# Patient Record
Sex: FEMALE | Race: WHITE | NOT HISPANIC OR LATINO | Employment: FULL TIME | URBAN - METROPOLITAN AREA
[De-identification: names, ages, dates, MRNs, and addresses within clinical notes are randomized per-mention and may not be internally consistent; named-entity substitution may affect disease eponyms.]

---

## 2017-03-29 ENCOUNTER — HOSPITAL ENCOUNTER (OUTPATIENT)
Dept: MEDSURG UNIT | Facility: CLINIC | Age: 27
Discharge: HOME | End: 2017-03-29
Attending: OBSTETRICS & GYNECOLOGY | Admitting: OBSTETRICS & GYNECOLOGY

## 2017-03-29 LAB
ALT SERPL W P-5'-P-CCNC: 14 U/L (ref 0–45)
AST SERPL W P-5'-P-CCNC: 19 U/L (ref 0–40)
CREAT SERPL-MCNC: 0.6 MG/DL (ref 0.6–1.1)
GFR SERPL CREATININE-BSD FRML MDRD: >60 ML/MIN/1.73M2

## 2017-03-29 RX ORDER — ACETAMINOPHEN 500 MG
500 TABLET ORAL EVERY 6 HOURS PRN
Status: SHIPPED | COMMUNITY
Start: 2017-03-29

## 2017-03-31 ENCOUNTER — RECORDS - HEALTHEAST (OUTPATIENT)
Dept: ADMINISTRATIVE | Facility: OTHER | Age: 27
End: 2017-03-31

## 2017-04-14 ENCOUNTER — ANESTHESIA - HEALTHEAST (OUTPATIENT)
Dept: OBGYN | Facility: CLINIC | Age: 27
End: 2017-04-14

## 2017-04-17 ENCOUNTER — COMMUNICATION - HEALTHEAST (OUTPATIENT)
Dept: OBGYN | Facility: CLINIC | Age: 27
End: 2017-04-17

## 2019-03-13 ENCOUNTER — HOSPITAL ENCOUNTER (OUTPATIENT)
Dept: MEDSURG UNIT | Facility: CLINIC | Age: 29
Discharge: HOME | End: 2019-03-13
Attending: OBSTETRICS & GYNECOLOGY | Admitting: OBSTETRICS & GYNECOLOGY

## 2019-03-13 LAB
BASOPHILS # BLD AUTO: 0 THOU/UL (ref 0–0.2)
BASOPHILS NFR BLD AUTO: 0 % (ref 0–2)
EOSINOPHIL # BLD AUTO: 0.1 THOU/UL (ref 0–0.4)
EOSINOPHIL NFR BLD AUTO: 1 % (ref 0–6)
ERYTHROCYTE [DISTWIDTH] IN BLOOD BY AUTOMATED COUNT: 12.5 % (ref 11–14.5)
FETAL RBC % LFV: 0 %
HCT VFR BLD AUTO: 35 % (ref 35–47)
HGB BLD-MCNC: 11.7 G/DL (ref 12–16)
LYMPHOCYTES # BLD AUTO: 1.5 THOU/UL (ref 0.8–4.4)
LYMPHOCYTES NFR BLD AUTO: 19 % (ref 20–40)
MCH RBC QN AUTO: 31 PG (ref 27–34)
MCHC RBC AUTO-ENTMCNC: 33.4 G/DL (ref 32–36)
MCV RBC AUTO: 93 FL (ref 80–100)
MONOCYTES # BLD AUTO: 0.6 THOU/UL (ref 0–0.9)
MONOCYTES NFR BLD AUTO: 8 % (ref 2–10)
NEUTROPHILS # BLD AUTO: 5.8 THOU/UL (ref 2–7.7)
NEUTROPHILS NFR BLD AUTO: 72 % (ref 50–70)
PLATELET # BLD AUTO: 183 THOU/UL (ref 140–440)
PMV BLD AUTO: 9.4 FL (ref 8.5–12.5)
RBC # BLD AUTO: 3.77 MILL/UL (ref 3.8–5.4)
WBC: 8.2 THOU/UL (ref 4–11)

## 2019-03-13 ASSESSMENT — MIFFLIN-ST. JEOR: SCORE: 1260.2

## 2019-06-17 ENCOUNTER — HOSPITAL ENCOUNTER (OUTPATIENT)
Dept: OBGYN | Facility: CLINIC | Age: 29
Discharge: HOME | End: 2019-06-17
Attending: OBSTETRICS & GYNECOLOGY | Admitting: OBSTETRICS & GYNECOLOGY

## 2019-06-17 LAB — RUPTURE OF FETAL MEMBRANES BY ROM PLUS: NEGATIVE

## 2019-06-18 ENCOUNTER — HOSPITAL ENCOUNTER (OUTPATIENT)
Dept: MEDSURG UNIT | Facility: CLINIC | Age: 29
Discharge: HOME | End: 2019-06-18
Attending: OBSTETRICS & GYNECOLOGY | Admitting: OBSTETRICS & GYNECOLOGY

## 2019-06-21 ENCOUNTER — ANESTHESIA - HEALTHEAST (OUTPATIENT)
Dept: OBGYN | Facility: CLINIC | Age: 29
End: 2019-06-21

## 2019-10-24 ENCOUNTER — PATIENT OUTREACH (OUTPATIENT)
Dept: CARE COORDINATION | Facility: CLINIC | Age: 29
End: 2019-10-24

## 2019-10-24 DIAGNOSIS — Z76.89 HEALTH CARE HOME: Primary | ICD-10-CM

## 2020-09-27 LAB
ABO (EXTERNAL): NORMAL
RH (EXTERNAL): POSITIVE

## 2021-02-18 LAB
ABO (EXTERNAL): NORMAL
HIV1+2 AB SERPL QL IA: NONREACTIVE
RH (EXTERNAL): NEGATIVE
RUBELLA ANTIBODY IGG (EXTERNAL): NORMAL

## 2021-02-19 LAB
HEMOGLOBIN (EXTERNAL): 11.4 G/DL (ref 11.1–15.9)
HEPATITIS B SURFACE ANTIGEN (EXTERNAL): NEGATIVE
PLATELET COUNT (EXTERNAL): 260 10E3/UL (ref 150–450)
VDRL (SYPHILIS) (EXTERNAL): NONREACTIVE

## 2021-05-29 NOTE — PROGRESS NOTES
SVE by RN, unchanged.  Pt stating while walking she notices her contractions to be evvery 2-3 minutes however when sitting, they are less frequent.  Pt denies SROM or bleeding. The patient is anxious about her prior rapid cervical dilation. Dr. Sauer paged.

## 2021-05-29 NOTE — PROGRESS NOTES
"Pt here with her  to rule out if she was in labor.  Pt stating since she has woken up, she has had contractions consistently every 5 minutes.  Pt is able to walk and talk through them and they palpate mild.  Pt denies SROM, bleeding or concern with fetal movement.    SVE by RN as noted.  Pt expressing concern with \"how fast things went last time\" that she is requesting to be here, be monitored and see if her cervix changes over time.      "

## 2021-05-29 NOTE — ANESTHESIA PREPROCEDURE EVALUATION
Anesthesia Evaluation      Patient summary reviewed     Airway   Mallampati: I  Neck ROM: full   Pulmonary - negative ROS and normal exam    breath sounds clear to auscultation                         Cardiovascular - negative ROS and normal exam  Rhythm: regular  Rate: normal,         Neuro/Psych - negative ROS     Endo/Other    (+) pregnant     GI/Hepatic/Renal - negative ROS           Dental - normal exam                        Anesthesia Plan  Planned anesthetic: epidural    ASA 2     Anesthetic plan and risks discussed with: patient and spouse    Post-op plan: routine recovery

## 2021-05-29 NOTE — PLAN OF CARE
Pt came in with complaints of leaking fluid. ROM+ negative. Periods of low baseline, but with moderate variability and accelerations. Pt turned to left side and eating, baseline now 120s. Dr. Paredes aware. Pt to be discharged home per provider's orders.

## 2021-05-29 NOTE — ANESTHESIA PROCEDURE NOTES
Epidural Block    Patient location during procedure: OB  Time Called: 6/21/2019 3:00 AM  Reason for Block:labor epidural  Staffing:  Performing  Anesthesiologist: Kevin Pagan MD  Preanesthetic Checklist  Completed: patient identified, risks, benefits, and alternatives discussed, timeout performed, consent obtained, airway assessed, oxygen available, suction available, emergency drugs available and hand hygiene performed  Procedure  Patient position: sitting  Prep: ChloraPrep and site prepped and draped  Patient monitoring: continuous pulse oximetry, heart rate and blood pressure  Approach: midline  Location: L2-L3  Injection technique: JUSTICE air  Number of Attempts:2  Needle  Needle type: Mariano   Needle gauge: 18 G     Catheter in Space: 6  Assessment  Sensory level:      Events: blood aspirated and positive IV test     Additional Notes:  Catheter replaced after above noted

## 2021-06-02 VITALS — HEIGHT: 63 IN | WEIGHT: 127 LBS | BODY MASS INDEX: 22.5 KG/M2

## 2021-06-09 NOTE — PROGRESS NOTES
Pt here for r/o of pre-eclampsia related to pt's report of headache, blurry vision, and nausea.  Pt denies swelling or epigastric pain.  Pt also reports a sore throat that is most bothersome to her and she wonders if she might be developing an URI.  Dr Paredes checked in on pt in triage.  Labs WNL.  WNL labs called to Dr Paredes in clinic and told staff that I will be discharging pt.  D/c instructions reviewed with pt and pt dc'ed to home under self care. Ally Tripp

## 2021-06-09 NOTE — PROGRESS NOTES
OB OBSERVATION NOTE    CHIEF COMPLAINT: headache and nausea    OBSTETRICAL / DATING HISTORY:  Estimated Date of Delivery: 17   Gestational Age: 37w6d    OB History    Para Term  AB SAB TAB Ectopic Multiple Living   1               # Outcome Date GA Lbr Lennox/2nd Weight Sex Delivery Anes PTL Lv   1 Current                      TESTING:  Labs:  No results found for requested labs within last 2700 days.          PAST MEDICAL HISTORY:  Past Medical History:   Diagnosis Date     Female infertility      Mental disorder     anxiety and depression       PAST SURGICAL HISTORY:  Past Surgical History:   Procedure Laterality Date     CYST REMOVAL Right     axilla     WISDOM TOOTH EXTRACTION Bilateral         FAMILY HISTORY:  No family history on file.    ALLERGIES:  Allergies   Allergen Reactions     Amoxicillin Hives     Keflex [Cephalexin] Hives     Minocycline Hives     Penicillins Hives     Tetracycline Hives         HISTORY OF PRESENT ILLNESS:    (Please see scanned  sheets for prenatal history. Examination at the time of admission revealed no interval change in the patient s history or physical exam except as described below.)    Aleida called in with c/o headache, nausea, blurred vision and a sore throat.  I had her present to L&D to r/o Pre-eclampsia.     REVIEW OF SYSTEMS:  See HPI, otherwise the review of systems is unremarkable.     PHYSICAL EXAM:   /66  Pulse 63  Temp 98.1  F (36.7  C) (Oral)   Resp 16   HEART: RRR  LUNGS: CTAB  EXT: 2+ DTRs  Fetal Heart Tones: category 1  CONTRACTIONS:  none  CERVIX: dilated - deferred    Membranes: intact  Fetal Presentation: vertex    Impression:  Likely viral syndrome.  She refused PO antiemetic. Pt. To be discharged if the PIH panel is normal.    Plan:  Routine prenatal care.         Sultana Paredes MD

## 2021-06-24 NOTE — PROGRESS NOTES
Discussed SBAR with Dr. Miller on the unit. Orders to monitor for 4 hours from arrival and obtain a CBC and KHB. Orders placed. Will discuss with plan with patient. Radha Mcrae 03/13/19 9:18 AM

## 2021-06-24 NOTE — PROGRESS NOTES
Pt is doing well.  No active bleeding or sudden onset of abdominal pain.  Pt reports feeling sore in her sacrum/bottom from the fall, and pulling in her lower abdominal area when she gets out of bed.  Dr Miller updated and order for tylenol received.  Continue to observe until 1240. Ally Tripp

## 2021-06-24 NOTE — PROGRESS NOTES
"Patient reports to [facility] with c/o of falling twice this morning. First fall occurred at 0715 and about an hour later at her place of work. Patient c/o having contractions, \"on and off,\" reporting the pain at 5/10. EFM placed on abdomen, FHT's audible at 140. Verbal orders to EFM within the first 4 hours after fall and to determine if patient is RH negative and if so to draw labs. Will obtain lab results from clinic and continue to monitor until 1115.  "

## 2021-06-24 NOTE — PROGRESS NOTES
Dr Engebretson called and updated that pt has no new signs or symptoms.  Pt to follow up at her next prenatal appt, in the next 2 weeks.  After 1240, EFM to be removed and pt discharged.  Ally Tripp

## 2021-06-26 ENCOUNTER — HEALTH MAINTENANCE LETTER (OUTPATIENT)
Age: 31
End: 2021-06-26

## 2021-07-01 ENCOUNTER — TRANSFERRED RECORDS (OUTPATIENT)
Dept: HEALTH INFORMATION MANAGEMENT | Facility: CLINIC | Age: 31
End: 2021-07-01

## 2021-07-02 ENCOUNTER — MEDICAL CORRESPONDENCE (OUTPATIENT)
Dept: HEALTH INFORMATION MANAGEMENT | Facility: CLINIC | Age: 31
End: 2021-07-02

## 2021-07-05 ENCOUNTER — AMBULATORY - HEALTHEAST (OUTPATIENT)
Dept: PHARMACY | Facility: CLINIC | Age: 31
End: 2021-07-05

## 2021-07-05 DIAGNOSIS — D50.9 IRON DEFICIENCY ANEMIA, UNSPECIFIED: ICD-10-CM

## 2021-07-09 DIAGNOSIS — D50.9 IRON DEFICIENCY ANEMIA, UNSPECIFIED IRON DEFICIENCY ANEMIA TYPE: Primary | ICD-10-CM

## 2021-07-09 RX ORDER — HEPARIN SODIUM,PORCINE 10 UNIT/ML
5 VIAL (ML) INTRAVENOUS
Status: CANCELLED | OUTPATIENT
Start: 2021-07-12

## 2021-07-09 RX ORDER — METHYLPREDNISOLONE SODIUM SUCCINATE 125 MG/2ML
125 INJECTION, POWDER, LYOPHILIZED, FOR SOLUTION INTRAMUSCULAR; INTRAVENOUS
Status: CANCELLED
Start: 2021-07-12

## 2021-07-09 RX ORDER — NALOXONE HYDROCHLORIDE 0.4 MG/ML
0.2 INJECTION, SOLUTION INTRAMUSCULAR; INTRAVENOUS; SUBCUTANEOUS
Status: CANCELLED | OUTPATIENT
Start: 2021-07-12

## 2021-07-09 RX ORDER — HEPARIN SODIUM (PORCINE) LOCK FLUSH IV SOLN 100 UNIT/ML 100 UNIT/ML
5 SOLUTION INTRAVENOUS
Status: CANCELLED | OUTPATIENT
Start: 2021-07-12

## 2021-07-09 RX ORDER — ALBUTEROL SULFATE 90 UG/1
1-2 AEROSOL, METERED RESPIRATORY (INHALATION)
Status: CANCELLED
Start: 2021-07-12

## 2021-07-09 RX ORDER — MEPERIDINE HYDROCHLORIDE 50 MG/ML
25 INJECTION INTRAMUSCULAR; INTRAVENOUS; SUBCUTANEOUS EVERY 30 MIN PRN
Status: CANCELLED | OUTPATIENT
Start: 2021-07-12

## 2021-07-09 RX ORDER — DIPHENHYDRAMINE HYDROCHLORIDE 50 MG/ML
50 INJECTION INTRAMUSCULAR; INTRAVENOUS
Status: CANCELLED
Start: 2021-07-12

## 2021-07-09 RX ORDER — EPINEPHRINE 1 MG/ML
0.3 INJECTION, SOLUTION INTRAMUSCULAR; SUBCUTANEOUS EVERY 5 MIN PRN
Status: CANCELLED | OUTPATIENT
Start: 2021-07-12

## 2021-07-09 RX ORDER — ALBUTEROL SULFATE 0.83 MG/ML
2.5 SOLUTION RESPIRATORY (INHALATION)
Status: CANCELLED | OUTPATIENT
Start: 2021-07-12

## 2021-07-11 PROBLEM — D50.9 IRON DEFICIENCY ANEMIA, UNSPECIFIED: Status: ACTIVE | Noted: 2021-07-05

## 2021-07-14 PROBLEM — Z34.90 PREGNANT: Status: RESOLVED | Noted: 2017-04-13 | Resolved: 2019-06-21

## 2021-07-16 ENCOUNTER — INFUSION THERAPY VISIT (OUTPATIENT)
Dept: INFUSION THERAPY | Facility: CLINIC | Age: 31
End: 2021-07-16
Payer: COMMERCIAL

## 2021-07-16 VITALS
OXYGEN SATURATION: 99 % | SYSTOLIC BLOOD PRESSURE: 92 MMHG | RESPIRATION RATE: 16 BRPM | TEMPERATURE: 98.2 F | DIASTOLIC BLOOD PRESSURE: 59 MMHG | HEART RATE: 73 BPM

## 2021-07-16 DIAGNOSIS — D50.9 IRON DEFICIENCY ANEMIA, UNSPECIFIED IRON DEFICIENCY ANEMIA TYPE: Primary | ICD-10-CM

## 2021-07-16 PROCEDURE — 250N000011 HC RX IP 250 OP 636: Performed by: OBSTETRICS & GYNECOLOGY

## 2021-07-16 PROCEDURE — 258N000003 HC RX IP 258 OP 636: Performed by: OBSTETRICS & GYNECOLOGY

## 2021-07-16 PROCEDURE — 96365 THER/PROPH/DIAG IV INF INIT: CPT

## 2021-07-16 RX ORDER — METHYLPREDNISOLONE SODIUM SUCCINATE 125 MG/2ML
125 INJECTION, POWDER, LYOPHILIZED, FOR SOLUTION INTRAMUSCULAR; INTRAVENOUS
Status: CANCELLED
Start: 2021-07-19

## 2021-07-16 RX ORDER — NALOXONE HYDROCHLORIDE 0.4 MG/ML
0.2 INJECTION, SOLUTION INTRAMUSCULAR; INTRAVENOUS; SUBCUTANEOUS
Status: DISCONTINUED | OUTPATIENT
Start: 2021-07-16 | End: 2021-07-16 | Stop reason: HOSPADM

## 2021-07-16 RX ORDER — ALBUTEROL SULFATE 90 UG/1
1-2 AEROSOL, METERED RESPIRATORY (INHALATION)
Status: CANCELLED
Start: 2021-07-19

## 2021-07-16 RX ORDER — HEPARIN SODIUM (PORCINE) LOCK FLUSH IV SOLN 100 UNIT/ML 100 UNIT/ML
5 SOLUTION INTRAVENOUS
Status: CANCELLED | OUTPATIENT
Start: 2021-07-19

## 2021-07-16 RX ORDER — EPINEPHRINE 1 MG/ML
0.3 INJECTION, SOLUTION INTRAMUSCULAR; SUBCUTANEOUS EVERY 5 MIN PRN
Status: CANCELLED | OUTPATIENT
Start: 2021-07-19

## 2021-07-16 RX ORDER — EPINEPHRINE 1 MG/ML
0.3 INJECTION, SOLUTION INTRAMUSCULAR; SUBCUTANEOUS EVERY 5 MIN PRN
Status: DISCONTINUED | OUTPATIENT
Start: 2021-07-16 | End: 2021-07-16 | Stop reason: HOSPADM

## 2021-07-16 RX ORDER — DIPHENHYDRAMINE HYDROCHLORIDE 50 MG/ML
50 INJECTION INTRAMUSCULAR; INTRAVENOUS
Status: CANCELLED
Start: 2021-07-19

## 2021-07-16 RX ORDER — METHYLPREDNISOLONE SODIUM SUCCINATE 125 MG/2ML
125 INJECTION, POWDER, LYOPHILIZED, FOR SOLUTION INTRAMUSCULAR; INTRAVENOUS
Status: DISCONTINUED | OUTPATIENT
Start: 2021-07-16 | End: 2021-07-16 | Stop reason: HOSPADM

## 2021-07-16 RX ORDER — NALOXONE HYDROCHLORIDE 0.4 MG/ML
0.2 INJECTION, SOLUTION INTRAMUSCULAR; INTRAVENOUS; SUBCUTANEOUS
Status: CANCELLED | OUTPATIENT
Start: 2021-07-19

## 2021-07-16 RX ORDER — MEPERIDINE HYDROCHLORIDE 50 MG/ML
25 INJECTION INTRAMUSCULAR; INTRAVENOUS; SUBCUTANEOUS EVERY 30 MIN PRN
Status: DISCONTINUED | OUTPATIENT
Start: 2021-07-16 | End: 2021-07-16 | Stop reason: HOSPADM

## 2021-07-16 RX ORDER — ALBUTEROL SULFATE 90 UG/1
1-2 AEROSOL, METERED RESPIRATORY (INHALATION)
Status: DISCONTINUED | OUTPATIENT
Start: 2021-07-16 | End: 2021-07-16 | Stop reason: HOSPADM

## 2021-07-16 RX ORDER — HEPARIN SODIUM,PORCINE 10 UNIT/ML
5 VIAL (ML) INTRAVENOUS
Status: CANCELLED | OUTPATIENT
Start: 2021-07-19

## 2021-07-16 RX ORDER — MEPERIDINE HYDROCHLORIDE 50 MG/ML
25 INJECTION INTRAMUSCULAR; INTRAVENOUS; SUBCUTANEOUS EVERY 30 MIN PRN
Status: CANCELLED | OUTPATIENT
Start: 2021-07-19

## 2021-07-16 RX ORDER — DIPHENHYDRAMINE HYDROCHLORIDE 50 MG/ML
50 INJECTION INTRAMUSCULAR; INTRAVENOUS
Status: DISCONTINUED | OUTPATIENT
Start: 2021-07-16 | End: 2021-07-16 | Stop reason: HOSPADM

## 2021-07-16 RX ORDER — ALBUTEROL SULFATE 0.83 MG/ML
2.5 SOLUTION RESPIRATORY (INHALATION)
Status: DISCONTINUED | OUTPATIENT
Start: 2021-07-16 | End: 2021-07-16 | Stop reason: HOSPADM

## 2021-07-16 RX ORDER — ALBUTEROL SULFATE 0.83 MG/ML
2.5 SOLUTION RESPIRATORY (INHALATION)
Status: CANCELLED | OUTPATIENT
Start: 2021-07-19

## 2021-07-16 RX ADMIN — FERUMOXYTOL 510 MG: 510 INJECTION INTRAVENOUS at 09:26

## 2021-07-16 RX ADMIN — SODIUM CHLORIDE 250 ML: 9 INJECTION, SOLUTION INTRAVENOUS at 09:26

## 2021-07-16 NOTE — PROGRESS NOTES
Infusion Nursing Note:  Aleida Carrizales presents today for iron infusion.    Patient seen by provider today: No   present during visit today: Not Applicable.    Note: Medication hand-out given. Reviewed whom and when to call. Pt observed >30 min post infusion.  Patient did not meet criteria for an asymptomatic covid-19 PCR test in infusion today.     Intravenous Access:  Peripheral IV placed by Salud OLIVER    Treatment Conditions:  Not Applicable.      Post Infusion Assessment:  Patient tolerated infusion without incident.       Discharge Plan:   Discharge instructions reviewed with: Patient.      Marii Lopez RN

## 2021-08-26 LAB — GROUP B STREPTOCOCCUS (EXTERNAL): NEGATIVE

## 2021-09-07 ENCOUNTER — HOSPITAL ENCOUNTER (INPATIENT)
Facility: CLINIC | Age: 31
LOS: 1 days | Discharge: HOME | DRG: 833 | End: 2021-09-07
Attending: OBSTETRICS & GYNECOLOGY | Admitting: OBSTETRICS & GYNECOLOGY
Payer: COMMERCIAL

## 2021-09-07 VITALS
BODY MASS INDEX: 25.59 KG/M2 | HEART RATE: 61 BPM | TEMPERATURE: 97.2 F | OXYGEN SATURATION: 98 % | RESPIRATION RATE: 15 BRPM | HEIGHT: 63 IN | WEIGHT: 144.4 LBS | DIASTOLIC BLOOD PRESSURE: 57 MMHG | SYSTOLIC BLOOD PRESSURE: 100 MMHG

## 2021-09-07 PROBLEM — Z36.89 ENCOUNTER FOR TRIAGE IN PREGNANT PATIENT: Status: ACTIVE | Noted: 2021-09-07

## 2021-09-07 PROBLEM — Z34.90 PREGNANCY: Status: ACTIVE | Noted: 2021-09-07

## 2021-09-07 LAB
ABO/RH(D): NORMAL
ANTIBODY SCREEN: NEGATIVE
BASOPHILS # BLD AUTO: 0 10E3/UL (ref 0–0.2)
BASOPHILS NFR BLD AUTO: 0 %
EOSINOPHIL # BLD AUTO: 0.1 10E3/UL (ref 0–0.7)
EOSINOPHIL NFR BLD AUTO: 1 %
ERYTHROCYTE [DISTWIDTH] IN BLOOD BY AUTOMATED COUNT: 13.2 % (ref 10–15)
HCT VFR BLD AUTO: 34.1 % (ref 35–47)
HGB BLD-MCNC: 11.5 G/DL (ref 11.7–15.7)
IMM GRANULOCYTES # BLD: 0.1 10E3/UL
IMM GRANULOCYTES NFR BLD: 1 %
LYMPHOCYTES # BLD AUTO: 2.1 10E3/UL (ref 0.8–5.3)
LYMPHOCYTES NFR BLD AUTO: 25 %
MCH RBC QN AUTO: 30.4 PG (ref 26.5–33)
MCHC RBC AUTO-ENTMCNC: 33.7 G/DL (ref 31.5–36.5)
MCV RBC AUTO: 90 FL (ref 78–100)
MONOCYTES # BLD AUTO: 0.7 10E3/UL (ref 0–1.3)
MONOCYTES NFR BLD AUTO: 8 %
NEUTROPHILS # BLD AUTO: 5.7 10E3/UL (ref 1.6–8.3)
NEUTROPHILS NFR BLD AUTO: 65 %
NRBC # BLD AUTO: 0 10E3/UL
NRBC BLD AUTO-RTO: 0 /100
PLATELET # BLD AUTO: 173 10E3/UL (ref 150–450)
RBC # BLD AUTO: 3.78 10E6/UL (ref 3.8–5.2)
SARS-COV-2 RNA RESP QL NAA+PROBE: NEGATIVE
SPECIMEN EXPIRATION DATE: NORMAL
T PALLIDUM AB SER QL: NEGATIVE
WBC # BLD AUTO: 8.6 10E3/UL (ref 4–11)

## 2021-09-07 PROCEDURE — 86780 TREPONEMA PALLIDUM: CPT | Performed by: OBSTETRICS & GYNECOLOGY

## 2021-09-07 PROCEDURE — 250N000011 HC RX IP 250 OP 636: Performed by: OBSTETRICS & GYNECOLOGY

## 2021-09-07 PROCEDURE — 36415 COLL VENOUS BLD VENIPUNCTURE: CPT | Performed by: OBSTETRICS & GYNECOLOGY

## 2021-09-07 PROCEDURE — 87635 SARS-COV-2 COVID-19 AMP PRB: CPT | Performed by: OBSTETRICS & GYNECOLOGY

## 2021-09-07 PROCEDURE — 86901 BLOOD TYPING SEROLOGIC RH(D): CPT | Performed by: OBSTETRICS & GYNECOLOGY

## 2021-09-07 PROCEDURE — 250N000013 HC RX MED GY IP 250 OP 250 PS 637: Performed by: OBSTETRICS & GYNECOLOGY

## 2021-09-07 PROCEDURE — 85025 COMPLETE CBC W/AUTO DIFF WBC: CPT | Performed by: OBSTETRICS & GYNECOLOGY

## 2021-09-07 PROCEDURE — 120N000001 HC R&B MED SURG/OB

## 2021-09-07 PROCEDURE — 258N000003 HC RX IP 258 OP 636: Performed by: OBSTETRICS & GYNECOLOGY

## 2021-09-07 RX ORDER — ONDANSETRON 4 MG/1
4 TABLET, ORALLY DISINTEGRATING ORAL EVERY 6 HOURS PRN
Status: DISCONTINUED | OUTPATIENT
Start: 2021-09-07 | End: 2021-09-07 | Stop reason: HOSPADM

## 2021-09-07 RX ORDER — NALOXONE HYDROCHLORIDE 0.4 MG/ML
0.2 INJECTION, SOLUTION INTRAMUSCULAR; INTRAVENOUS; SUBCUTANEOUS
Status: DISCONTINUED | OUTPATIENT
Start: 2021-09-07 | End: 2021-09-07 | Stop reason: HOSPADM

## 2021-09-07 RX ORDER — CARBOPROST TROMETHAMINE 250 UG/ML
250 INJECTION, SOLUTION INTRAMUSCULAR
Status: DISCONTINUED | OUTPATIENT
Start: 2021-09-07 | End: 2021-09-07 | Stop reason: HOSPADM

## 2021-09-07 RX ORDER — OXYTOCIN/0.9 % SODIUM CHLORIDE 30/500 ML
340 PLASTIC BAG, INJECTION (ML) INTRAVENOUS CONTINUOUS PRN
Status: DISCONTINUED | OUTPATIENT
Start: 2021-09-07 | End: 2021-09-07 | Stop reason: HOSPADM

## 2021-09-07 RX ORDER — SODIUM CHLORIDE, SODIUM LACTATE, POTASSIUM CHLORIDE, CALCIUM CHLORIDE 600; 310; 30; 20 MG/100ML; MG/100ML; MG/100ML; MG/100ML
INJECTION, SOLUTION INTRAVENOUS CONTINUOUS
Status: DISCONTINUED | OUTPATIENT
Start: 2021-09-07 | End: 2021-09-07 | Stop reason: HOSPADM

## 2021-09-07 RX ORDER — ACETAMINOPHEN 325 MG/1
650 TABLET ORAL EVERY 4 HOURS PRN
Status: DISCONTINUED | OUTPATIENT
Start: 2021-09-07 | End: 2021-09-07 | Stop reason: HOSPADM

## 2021-09-07 RX ORDER — OXYTOCIN 10 [USP'U]/ML
10 INJECTION, SOLUTION INTRAMUSCULAR; INTRAVENOUS
Status: DISCONTINUED | OUTPATIENT
Start: 2021-09-07 | End: 2021-09-07 | Stop reason: HOSPADM

## 2021-09-07 RX ORDER — FENTANYL CITRATE 50 UG/ML
50-100 INJECTION, SOLUTION INTRAMUSCULAR; INTRAVENOUS
Status: DISCONTINUED | OUTPATIENT
Start: 2021-09-07 | End: 2021-09-07 | Stop reason: HOSPADM

## 2021-09-07 RX ORDER — MISOPROSTOL 200 UG/1
400 TABLET ORAL
Status: DISCONTINUED | OUTPATIENT
Start: 2021-09-07 | End: 2021-09-07 | Stop reason: HOSPADM

## 2021-09-07 RX ORDER — OXYTOCIN/0.9 % SODIUM CHLORIDE 30/500 ML
100-340 PLASTIC BAG, INJECTION (ML) INTRAVENOUS CONTINUOUS PRN
Status: DISCONTINUED | OUTPATIENT
Start: 2021-09-07 | End: 2021-09-07 | Stop reason: HOSPADM

## 2021-09-07 RX ORDER — HYDROXYZINE HYDROCHLORIDE 25 MG/1
50 TABLET, FILM COATED ORAL ONCE
Status: DISCONTINUED | OUTPATIENT
Start: 2021-09-07 | End: 2021-09-07

## 2021-09-07 RX ORDER — MISOPROSTOL 200 UG/1
800 TABLET ORAL
Status: DISCONTINUED | OUTPATIENT
Start: 2021-09-07 | End: 2021-09-07 | Stop reason: HOSPADM

## 2021-09-07 RX ORDER — ONDANSETRON 2 MG/ML
4 INJECTION INTRAMUSCULAR; INTRAVENOUS EVERY 6 HOURS PRN
Status: DISCONTINUED | OUTPATIENT
Start: 2021-09-07 | End: 2021-09-07 | Stop reason: HOSPADM

## 2021-09-07 RX ORDER — METHYLERGONOVINE MALEATE 0.2 MG/ML
200 INJECTION INTRAVENOUS
Status: DISCONTINUED | OUTPATIENT
Start: 2021-09-07 | End: 2021-09-07 | Stop reason: HOSPADM

## 2021-09-07 RX ORDER — NALOXONE HYDROCHLORIDE 0.4 MG/ML
0.4 INJECTION, SOLUTION INTRAMUSCULAR; INTRAVENOUS; SUBCUTANEOUS
Status: DISCONTINUED | OUTPATIENT
Start: 2021-09-07 | End: 2021-09-07 | Stop reason: HOSPADM

## 2021-09-07 RX ORDER — KETOROLAC TROMETHAMINE 30 MG/ML
30 INJECTION, SOLUTION INTRAMUSCULAR; INTRAVENOUS
Status: DISCONTINUED | OUTPATIENT
Start: 2021-09-07 | End: 2021-09-07 | Stop reason: HOSPADM

## 2021-09-07 RX ORDER — PROCHLORPERAZINE MALEATE 10 MG
10 TABLET ORAL EVERY 6 HOURS PRN
Status: DISCONTINUED | OUTPATIENT
Start: 2021-09-07 | End: 2021-09-07 | Stop reason: HOSPADM

## 2021-09-07 RX ORDER — METOCLOPRAMIDE 10 MG/1
10 TABLET ORAL EVERY 6 HOURS PRN
Status: DISCONTINUED | OUTPATIENT
Start: 2021-09-07 | End: 2021-09-07 | Stop reason: HOSPADM

## 2021-09-07 RX ORDER — PROCHLORPERAZINE 25 MG
25 SUPPOSITORY, RECTAL RECTAL EVERY 12 HOURS PRN
Status: DISCONTINUED | OUTPATIENT
Start: 2021-09-07 | End: 2021-09-07 | Stop reason: HOSPADM

## 2021-09-07 RX ORDER — HYDROXYZINE HYDROCHLORIDE 25 MG/1
100 TABLET, FILM COATED ORAL ONCE
Status: COMPLETED | OUTPATIENT
Start: 2021-09-07 | End: 2021-09-07

## 2021-09-07 RX ORDER — LIDOCAINE 40 MG/G
CREAM TOPICAL
Status: DISCONTINUED | OUTPATIENT
Start: 2021-09-07 | End: 2021-09-07 | Stop reason: HOSPADM

## 2021-09-07 RX ORDER — MORPHINE SULFATE 10 MG/ML
10 INJECTION, SOLUTION INTRAMUSCULAR; INTRAVENOUS ONCE
Status: COMPLETED | OUTPATIENT
Start: 2021-09-07 | End: 2021-09-07

## 2021-09-07 RX ORDER — METOCLOPRAMIDE HYDROCHLORIDE 5 MG/ML
10 INJECTION INTRAMUSCULAR; INTRAVENOUS EVERY 6 HOURS PRN
Status: DISCONTINUED | OUTPATIENT
Start: 2021-09-07 | End: 2021-09-07 | Stop reason: HOSPADM

## 2021-09-07 RX ORDER — IBUPROFEN 600 MG/1
600 TABLET, FILM COATED ORAL
Status: DISCONTINUED | OUTPATIENT
Start: 2021-09-07 | End: 2021-09-07 | Stop reason: HOSPADM

## 2021-09-07 RX ADMIN — SODIUM CHLORIDE, POTASSIUM CHLORIDE, SODIUM LACTATE AND CALCIUM CHLORIDE 1000 ML: 600; 310; 30; 20 INJECTION, SOLUTION INTRAVENOUS at 05:26

## 2021-09-07 RX ADMIN — MORPHINE SULFATE 10 MG: 10 INJECTION INTRAVENOUS at 08:38

## 2021-09-07 RX ADMIN — SODIUM CHLORIDE, POTASSIUM CHLORIDE, SODIUM LACTATE AND CALCIUM CHLORIDE 125 ML/HR: 600; 310; 30; 20 INJECTION, SOLUTION INTRAVENOUS at 06:34

## 2021-09-07 RX ADMIN — HYDROXYZINE HYDROCHLORIDE 100 MG: 25 TABLET ORAL at 08:39

## 2021-09-07 ASSESSMENT — MIFFLIN-ST. JEOR: SCORE: 1334.12

## 2021-09-07 NOTE — PROVIDER NOTIFICATION
Dr Gutierres called for pt status update and plan. Pt is given the option to try therapeutic sleep meds and be rechecked after rest or discharge to home. Pt is choosing therapeutic rest meds.     Dr. Gutierres ok with pt off monitor after 20min reactive strip.

## 2021-09-07 NOTE — PROGRESS NOTES
Called Dr. Sauer to update MD of pt progress. SVE 2/70/-3 with no bulging bag of water. RN previously felt bulging bag with SVE 3-4/70/-1. Pt was bolused for epidural and contractions spaced out 3-10 minutes apart. MD ordered for pt to walk. If the pt doesn't make any change that she will more than likely be going home later on in the day.

## 2021-09-07 NOTE — DISCHARGE SUMMARY
Dr. Gutierres to bedside to assess pt. Cervix unchanged. Dr. Gutierres wants pt to discharge post obs status.

## 2021-09-07 NOTE — PROGRESS NOTES
Patient admitted overnight with contractions and initial cervical change to 3-4cm. Received IV fluid bolus for epidural and contractions spaced out. Rechecked this AM and was found to only be 2 cm. Given sleep meds this AM and has not felt any further painful contractions. SVE 1/70/-2. Patient concerned about going home due to history of fast labors. Discussed inability to augment due to gestation at this point. Suggested having people on hand this week in case she needs to come back quickly. Has visit on Thursday to discuss 39 wk IOL. Call sooner with any signs of labor or ROM.

## 2021-09-07 NOTE — DISCHARGE INSTRUCTIONS
Data: Patient assessed in the [facility] for abdominal pain.  Cervical exam unchanged.  Membranes intact.  Contractions 3-8.  Action:  Presumed adequate fetal oxygenation documented (see flow record). Discharge instructions reviewed.  Patient instructed to report change in fetal movement, vaginal leaking of fluid or bleeding, abdominal pain, or any concerns related to the pregnancy to her nurse/physician.    Response: Orders to discharge home per Irlanda Sauer.  Patient verbalized understanding of education and verbalized agreement with plan. Discharged to home at 1351.

## 2021-09-10 ENCOUNTER — LAB (OUTPATIENT)
Dept: LAB | Facility: CLINIC | Age: 31
End: 2021-09-10
Attending: OBSTETRICS & GYNECOLOGY
Payer: COMMERCIAL

## 2021-09-10 DIAGNOSIS — Z33.1 PREGNANCY, INCIDENTAL: ICD-10-CM

## 2021-09-10 LAB — SARS-COV-2 RNA RESP QL NAA+PROBE: NEGATIVE

## 2021-09-10 PROCEDURE — U0005 INFEC AGEN DETEC AMPLI PROBE: HCPCS

## 2021-09-10 PROCEDURE — U0003 INFECTIOUS AGENT DETECTION BY NUCLEIC ACID (DNA OR RNA); SEVERE ACUTE RESPIRATORY SYNDROME CORONAVIRUS 2 (SARS-COV-2) (CORONAVIRUS DISEASE [COVID-19]), AMPLIFIED PROBE TECHNIQUE, MAKING USE OF HIGH THROUGHPUT TECHNOLOGIES AS DESCRIBED BY CMS-2020-01-R: HCPCS

## 2021-09-14 ENCOUNTER — ANESTHESIA EVENT (OUTPATIENT)
Dept: OBGYN | Facility: CLINIC | Age: 31
End: 2021-09-14
Payer: COMMERCIAL

## 2021-09-14 ENCOUNTER — HOSPITAL ENCOUNTER (INPATIENT)
Facility: CLINIC | Age: 31
LOS: 2 days | Discharge: HOME | End: 2021-09-16
Attending: OBSTETRICS & GYNECOLOGY | Admitting: OBSTETRICS & GYNECOLOGY
Payer: COMMERCIAL

## 2021-09-14 ENCOUNTER — ANESTHESIA (OUTPATIENT)
Dept: OBGYN | Facility: CLINIC | Age: 31
End: 2021-09-14
Payer: COMMERCIAL

## 2021-09-14 PROBLEM — Z34.90 ENCOUNTER FOR ELECTIVE INDUCTION OF LABOR: Status: ACTIVE | Noted: 2021-09-14

## 2021-09-14 LAB
ABO/RH(D): NORMAL
ANTIBODY SCREEN: NEGATIVE
HOLD SPECIMEN: NORMAL
SPECIMEN EXPIRATION DATE: NORMAL
T PALLIDUM AB SER QL: NEGATIVE

## 2021-09-14 PROCEDURE — 86780 TREPONEMA PALLIDUM: CPT | Performed by: OBSTETRICS & GYNECOLOGY

## 2021-09-14 PROCEDURE — 250N000009 HC RX 250: Performed by: OBSTETRICS & GYNECOLOGY

## 2021-09-14 PROCEDURE — 0KQM0ZZ REPAIR PERINEUM MUSCLE, OPEN APPROACH: ICD-10-PCS | Performed by: OBSTETRICS & GYNECOLOGY

## 2021-09-14 PROCEDURE — 120N000001 HC R&B MED SURG/OB

## 2021-09-14 PROCEDURE — 36415 COLL VENOUS BLD VENIPUNCTURE: CPT | Performed by: OBSTETRICS & GYNECOLOGY

## 2021-09-14 PROCEDURE — 370N000003 HC ANESTHESIA WARD SERVICE

## 2021-09-14 PROCEDURE — 250N000011 HC RX IP 250 OP 636: Performed by: ANESTHESIOLOGY

## 2021-09-14 PROCEDURE — 3E0R3BZ INTRODUCTION OF ANESTHETIC AGENT INTO SPINAL CANAL, PERCUTANEOUS APPROACH: ICD-10-PCS | Performed by: ANESTHESIOLOGY

## 2021-09-14 PROCEDURE — 250N000009 HC RX 250: Performed by: ANESTHESIOLOGY

## 2021-09-14 PROCEDURE — 250N000013 HC RX MED GY IP 250 OP 250 PS 637: Performed by: OBSTETRICS & GYNECOLOGY

## 2021-09-14 PROCEDURE — 722N000001 HC LABOR CARE VAGINAL DELIVERY SINGLE

## 2021-09-14 PROCEDURE — 10907ZC DRAINAGE OF AMNIOTIC FLUID, THERAPEUTIC FROM PRODUCTS OF CONCEPTION, VIA NATURAL OR ARTIFICIAL OPENING: ICD-10-PCS | Performed by: OBSTETRICS & GYNECOLOGY

## 2021-09-14 PROCEDURE — 86900 BLOOD TYPING SEROLOGIC ABO: CPT | Performed by: OBSTETRICS & GYNECOLOGY

## 2021-09-14 PROCEDURE — 258N000003 HC RX IP 258 OP 636: Performed by: OBSTETRICS & GYNECOLOGY

## 2021-09-14 PROCEDURE — 00HU33Z INSERTION OF INFUSION DEVICE INTO SPINAL CANAL, PERCUTANEOUS APPROACH: ICD-10-PCS | Performed by: ANESTHESIOLOGY

## 2021-09-14 PROCEDURE — 258N000003 HC RX IP 258 OP 636: Performed by: ANESTHESIOLOGY

## 2021-09-14 RX ORDER — ONDANSETRON 4 MG/1
4 TABLET, ORALLY DISINTEGRATING ORAL EVERY 6 HOURS PRN
Status: DISCONTINUED | OUTPATIENT
Start: 2021-09-14 | End: 2021-09-14 | Stop reason: CLARIF

## 2021-09-14 RX ORDER — MISOPROSTOL 200 UG/1
800 TABLET ORAL
Status: DISCONTINUED | OUTPATIENT
Start: 2021-09-14 | End: 2021-09-16 | Stop reason: HOSPADM

## 2021-09-14 RX ORDER — OXYTOCIN 10 [USP'U]/ML
10 INJECTION, SOLUTION INTRAMUSCULAR; INTRAVENOUS
Status: DISCONTINUED | OUTPATIENT
Start: 2021-09-14 | End: 2021-09-14 | Stop reason: CLARIF

## 2021-09-14 RX ORDER — BISACODYL 10 MG
10 SUPPOSITORY, RECTAL RECTAL DAILY PRN
Status: DISCONTINUED | OUTPATIENT
Start: 2021-09-14 | End: 2021-09-16 | Stop reason: HOSPADM

## 2021-09-14 RX ORDER — BUPIVACAINE HYDROCHLORIDE 2.5 MG/ML
INJECTION, SOLUTION EPIDURAL; INFILTRATION; INTRACAUDAL
Status: COMPLETED | OUTPATIENT
Start: 2021-09-14 | End: 2021-09-14

## 2021-09-14 RX ORDER — METHYLERGONOVINE MALEATE 0.2 MG/ML
200 INJECTION INTRAVENOUS
Status: DISCONTINUED | OUTPATIENT
Start: 2021-09-14 | End: 2021-09-16 | Stop reason: HOSPADM

## 2021-09-14 RX ORDER — ONDANSETRON 2 MG/ML
4 INJECTION INTRAMUSCULAR; INTRAVENOUS EVERY 6 HOURS PRN
Status: DISCONTINUED | OUTPATIENT
Start: 2021-09-14 | End: 2021-09-14 | Stop reason: CLARIF

## 2021-09-14 RX ORDER — HYDROCORTISONE 2.5 %
CREAM (GRAM) TOPICAL 3 TIMES DAILY PRN
Status: DISCONTINUED | OUTPATIENT
Start: 2021-09-14 | End: 2021-09-16 | Stop reason: HOSPADM

## 2021-09-14 RX ORDER — CARBOPROST TROMETHAMINE 250 UG/ML
250 INJECTION, SOLUTION INTRAMUSCULAR
Status: DISCONTINUED | OUTPATIENT
Start: 2021-09-14 | End: 2021-09-16 | Stop reason: HOSPADM

## 2021-09-14 RX ORDER — PROCHLORPERAZINE 25 MG
25 SUPPOSITORY, RECTAL RECTAL EVERY 12 HOURS PRN
Status: DISCONTINUED | OUTPATIENT
Start: 2021-09-14 | End: 2021-09-14 | Stop reason: CLARIF

## 2021-09-14 RX ORDER — LIDOCAINE 40 MG/G
CREAM TOPICAL
Status: DISCONTINUED | OUTPATIENT
Start: 2021-09-14 | End: 2021-09-14 | Stop reason: CLARIF

## 2021-09-14 RX ORDER — NALBUPHINE HYDROCHLORIDE 10 MG/ML
2.5-5 INJECTION, SOLUTION INTRAMUSCULAR; INTRAVENOUS; SUBCUTANEOUS EVERY 6 HOURS PRN
Status: DISCONTINUED | OUTPATIENT
Start: 2021-09-14 | End: 2021-09-14 | Stop reason: CLARIF

## 2021-09-14 RX ORDER — DOCUSATE SODIUM 100 MG/1
100 CAPSULE, LIQUID FILLED ORAL DAILY
Status: DISCONTINUED | OUTPATIENT
Start: 2021-09-14 | End: 2021-09-16 | Stop reason: HOSPADM

## 2021-09-14 RX ORDER — MISOPROSTOL 200 UG/1
400 TABLET ORAL
Status: DISCONTINUED | OUTPATIENT
Start: 2021-09-14 | End: 2021-09-16 | Stop reason: HOSPADM

## 2021-09-14 RX ORDER — NALOXONE HYDROCHLORIDE 0.4 MG/ML
0.4 INJECTION, SOLUTION INTRAMUSCULAR; INTRAVENOUS; SUBCUTANEOUS
Status: DISCONTINUED | OUTPATIENT
Start: 2021-09-14 | End: 2021-09-14 | Stop reason: CLARIF

## 2021-09-14 RX ORDER — NALOXONE HYDROCHLORIDE 0.4 MG/ML
0.2 INJECTION, SOLUTION INTRAMUSCULAR; INTRAVENOUS; SUBCUTANEOUS
Status: DISCONTINUED | OUTPATIENT
Start: 2021-09-14 | End: 2021-09-14 | Stop reason: CLARIF

## 2021-09-14 RX ORDER — METHYLERGONOVINE MALEATE 0.2 MG/ML
200 INJECTION INTRAVENOUS
Status: DISCONTINUED | OUTPATIENT
Start: 2021-09-14 | End: 2021-09-14 | Stop reason: CLARIF

## 2021-09-14 RX ORDER — FAMOTIDINE 20 MG
1000 TABLET ORAL
COMMUNITY

## 2021-09-14 RX ORDER — OXYTOCIN/0.9 % SODIUM CHLORIDE 30/500 ML
340 PLASTIC BAG, INJECTION (ML) INTRAVENOUS CONTINUOUS PRN
Status: DISCONTINUED | OUTPATIENT
Start: 2021-09-14 | End: 2021-09-16 | Stop reason: HOSPADM

## 2021-09-14 RX ORDER — ACETAMINOPHEN 325 MG/1
650 TABLET ORAL EVERY 4 HOURS PRN
Status: DISCONTINUED | OUTPATIENT
Start: 2021-09-14 | End: 2021-09-16 | Stop reason: HOSPADM

## 2021-09-14 RX ORDER — LIDOCAINE HCL/EPINEPHRINE/PF 2%-1:200K
VIAL (ML) INJECTION
Status: COMPLETED | OUTPATIENT
Start: 2021-09-14 | End: 2021-09-14

## 2021-09-14 RX ORDER — FENTANYL CITRATE 50 UG/ML
50-100 INJECTION, SOLUTION INTRAMUSCULAR; INTRAVENOUS
Status: DISCONTINUED | OUTPATIENT
Start: 2021-09-14 | End: 2021-09-14 | Stop reason: CLARIF

## 2021-09-14 RX ORDER — IBUPROFEN 600 MG/1
600 TABLET, FILM COATED ORAL
Status: DISCONTINUED | OUTPATIENT
Start: 2021-09-14 | End: 2021-09-14 | Stop reason: CLARIF

## 2021-09-14 RX ORDER — MISOPROSTOL 200 UG/1
400 TABLET ORAL
Status: DISCONTINUED | OUTPATIENT
Start: 2021-09-14 | End: 2021-09-14 | Stop reason: CLARIF

## 2021-09-14 RX ORDER — CARBOPROST TROMETHAMINE 250 UG/ML
250 INJECTION, SOLUTION INTRAMUSCULAR
Status: DISCONTINUED | OUTPATIENT
Start: 2021-09-14 | End: 2021-09-14 | Stop reason: CLARIF

## 2021-09-14 RX ORDER — METOCLOPRAMIDE 10 MG/1
10 TABLET ORAL EVERY 6 HOURS PRN
Status: DISCONTINUED | OUTPATIENT
Start: 2021-09-14 | End: 2021-09-14 | Stop reason: CLARIF

## 2021-09-14 RX ORDER — EPHEDRINE SULFATE 50 MG/ML
5 INJECTION, SOLUTION INTRAMUSCULAR; INTRAVENOUS; SUBCUTANEOUS
Status: DISCONTINUED | OUTPATIENT
Start: 2021-09-14 | End: 2021-09-14 | Stop reason: CLARIF

## 2021-09-14 RX ORDER — MISOPROSTOL 200 UG/1
800 TABLET ORAL
Status: DISCONTINUED | OUTPATIENT
Start: 2021-09-14 | End: 2021-09-14 | Stop reason: CLARIF

## 2021-09-14 RX ORDER — OXYCODONE HYDROCHLORIDE 5 MG/1
5 TABLET ORAL EVERY 4 HOURS PRN
Status: DISCONTINUED | OUTPATIENT
Start: 2021-09-14 | End: 2021-09-16 | Stop reason: HOSPADM

## 2021-09-14 RX ORDER — OXYTOCIN 10 [USP'U]/ML
10 INJECTION, SOLUTION INTRAMUSCULAR; INTRAVENOUS PRN
Status: DISCONTINUED | OUTPATIENT
Start: 2021-09-14 | End: 2021-09-16 | Stop reason: HOSPADM

## 2021-09-14 RX ORDER — OXYTOCIN/0.9 % SODIUM CHLORIDE 30/500 ML
4-24 PLASTIC BAG, INJECTION (ML) INTRAVENOUS CONTINUOUS
Status: DISCONTINUED | OUTPATIENT
Start: 2021-09-14 | End: 2021-09-14 | Stop reason: CLARIF

## 2021-09-14 RX ORDER — METOCLOPRAMIDE HYDROCHLORIDE 5 MG/ML
10 INJECTION INTRAMUSCULAR; INTRAVENOUS EVERY 6 HOURS PRN
Status: DISCONTINUED | OUTPATIENT
Start: 2021-09-14 | End: 2021-09-14 | Stop reason: CLARIF

## 2021-09-14 RX ORDER — ACETAMINOPHEN 325 MG/1
650 TABLET ORAL EVERY 4 HOURS PRN
Status: DISCONTINUED | OUTPATIENT
Start: 2021-09-14 | End: 2021-09-14 | Stop reason: CLARIF

## 2021-09-14 RX ORDER — OXYTOCIN/0.9 % SODIUM CHLORIDE 30/500 ML
100-340 PLASTIC BAG, INJECTION (ML) INTRAVENOUS CONTINUOUS PRN
Status: DISCONTINUED | OUTPATIENT
Start: 2021-09-14 | End: 2021-09-14 | Stop reason: CLARIF

## 2021-09-14 RX ORDER — MODIFIED LANOLIN
OINTMENT (GRAM) TOPICAL
Status: DISCONTINUED | OUTPATIENT
Start: 2021-09-14 | End: 2021-09-16 | Stop reason: HOSPADM

## 2021-09-14 RX ORDER — KETOROLAC TROMETHAMINE 30 MG/ML
30 INJECTION, SOLUTION INTRAMUSCULAR; INTRAVENOUS
Status: DISCONTINUED | OUTPATIENT
Start: 2021-09-14 | End: 2021-09-14 | Stop reason: CLARIF

## 2021-09-14 RX ORDER — OXYTOCIN/0.9 % SODIUM CHLORIDE 30/500 ML
340 PLASTIC BAG, INJECTION (ML) INTRAVENOUS CONTINUOUS PRN
Status: DISCONTINUED | OUTPATIENT
Start: 2021-09-14 | End: 2021-09-14 | Stop reason: CLARIF

## 2021-09-14 RX ORDER — PROCHLORPERAZINE MALEATE 10 MG
10 TABLET ORAL EVERY 6 HOURS PRN
Status: DISCONTINUED | OUTPATIENT
Start: 2021-09-14 | End: 2021-09-14 | Stop reason: CLARIF

## 2021-09-14 RX ORDER — IBUPROFEN 800 MG/1
800 TABLET, FILM COATED ORAL EVERY 6 HOURS PRN
Status: DISCONTINUED | OUTPATIENT
Start: 2021-09-14 | End: 2021-09-16 | Stop reason: HOSPADM

## 2021-09-14 RX ADMIN — BENZOCAINE AND LEVOMENTHOL: 200; 5 SPRAY TOPICAL at 21:21

## 2021-09-14 RX ADMIN — IBUPROFEN 800 MG: 800 TABLET ORAL at 21:21

## 2021-09-14 RX ADMIN — LIDOCAINE HYDROCHLORIDE,EPINEPHRINE BITARTRATE 5 ML: 20; .005 INJECTION, SOLUTION EPIDURAL; INFILTRATION; INTRACAUDAL; PERINEURAL at 15:07

## 2021-09-14 RX ADMIN — SODIUM CHLORIDE, POTASSIUM CHLORIDE, SODIUM LACTATE AND CALCIUM CHLORIDE 1000 ML: 600; 310; 30; 20 INJECTION, SOLUTION INTRAVENOUS at 08:41

## 2021-09-14 RX ADMIN — Medication: at 15:10

## 2021-09-14 RX ADMIN — DOCUSATE SODIUM 100 MG: 100 CAPSULE, LIQUID FILLED ORAL at 21:20

## 2021-09-14 RX ADMIN — Medication: at 21:20

## 2021-09-14 RX ADMIN — Medication 4 MILLI-UNITS/MIN: at 08:49

## 2021-09-14 RX ADMIN — BUPIVACAINE HYDROCHLORIDE 5 ML: 2.5 INJECTION, SOLUTION EPIDURAL; INFILTRATION; INTRACAUDAL at 15:07

## 2021-09-14 RX ADMIN — SODIUM CHLORIDE, POTASSIUM CHLORIDE, SODIUM LACTATE AND CALCIUM CHLORIDE 1000 ML: 600; 310; 30; 20 INJECTION, SOLUTION INTRAVENOUS at 15:50

## 2021-09-14 ASSESSMENT — MIFFLIN-ST. JEOR: SCORE: 1332.31

## 2021-09-14 NOTE — ANESTHESIA PROCEDURE NOTES
Epidural catheter Procedure Note  Pre-Procedure   Staff -        Anesthesiologist:  Ty Anaya MD       Performed By: anesthesiologist       Location: floor       Procedure Start/Stop Times: 9/14/2021 2:55 PM and 9/14/2021 2:17 PM       Pre-Anesthestic Checklist: patient identified, IV checked, risks and benefits discussed, informed consent, monitors and equipment checked, pre-op evaluation and at physician/surgeon's request  Timeout:       Correct Patient: Yes        Correct Procedure: Yes        Correct Site: Yes        Correct Position: Yes   Procedure Documentation  Procedure: epidural catheter       Patient Position: sitting       Skin prep: Chloraprep      Local skin infiltrated with mL of 1% lidocaine.        Insertion Site: L3-4. (midline approach).       Technique: LORT saline        JUSTICE at 4.5 cm.       Needle Type: PoolCubesy needle       Needle Gauge: 18.        Needle Length (Inches): 3.5         Catheter threaded easily.         5 cm epidural space.         Threaded 9.5 cm at skin.        # of attempts: 1 and  # of redirects:     Assessment/Narrative         Paresthesias: No.     Test dose of mL lidocaine 1.5% w/ 1:200,000 epinephrine at.         Test dose negative, 3 minutes after injection, for signs of intravascular, subdural, or intrathecal injection.       Insertion/Infusion Method: LORT saline       Aspiration negative for Heme or CSF via Epidural Catheter.    Medication(s) Administered   0.25% Bupivacaine PF (Epidural), 5 mL  2% Lidocaine w/ 1:200K Epi (EPIDURAL), 5 mL  Medication Administration Time: 9/14/2021 3:07 PM

## 2021-09-14 NOTE — H&P
[facility] Labor and Delivery History and Physical    Aleida Tracey MRN# [id number]   Age: 31 year old YOB: 1990     Date of Admission: 2021    Primary care provider: Dalila Sage    Chief Complaint:   Aleida Tracey is a 31 year old female who is 39w2d pregnant and being admitted for induction of labor, indication elective.    History of present illness:   Patient presents for scheduled elective IOL.  Pregnancy has been complicated by fatigue and sciatica     Pregnancy history:     OBSTETRIC HISTORY:    OB History    Para Term  AB Living   3 2 2 0 0 2   SAB TAB Ectopic Multiple Live Births   0 0 0 0 2      # Outcome Date GA Lbr Lennox/2nd Weight Sex Delivery Anes PTL Lv   3 Current            2 Term 19 40w4d 09:13 / 00:11 4.09 kg (9 lb 0.3 oz) F Vag-Spont EPI N FATOU      Complications: Fetal Intolerance      Name: SUNNI TRACEY      Apgar1: 8  Apgar5: 9   1 Term 17 40w1d 04:35 / 00:28 3.43 kg (7 lb 9 oz) F Vag-Spont Local  FATOU      Name: SUNNI TRACEY      Apgar1: 9  Apgar5: 9       EDC: Estimated Date of Delivery: Sep 19, 2021    GBS Status:   Lab Results   Component Value Date    GBS Negative 2021       Medication Prior to Admission  Medications Prior to Admission   Medication Sig Dispense Refill Last Dose     Vitamin D, Cholecalciferol, 25 MCG (1000 UT) CAPS Take 1,000 Units by mouth        acetaminophen (TYLENOL) 500 MG tablet [ACETAMINOPHEN (TYLENOL) 500 MG TABLET] Take 500 mg by mouth every 6 (six) hours as needed for pain.        prenatal no115-iron-folic acid 29 mg iron- 1 mg Chew [PRENATAL -IRON-FOLIC ACID 29 MG IRON- 1 MG CHEW] Chew 1 tablet daily.      .     Pregnancy Complications/Problems:  39.2 weeks IUP  Fatigue  Sciatica     Maternal Past Medical History:   History reviewed. No pertinent past medical history.                 Family History:   Non-contributory     Social History:   I have  reviewed this patient's social history and commented on significant items within the HPI    Past Surgical History:    Non-contributory    Physical Exam:   Vitals were reviewed  Temp: 98.4  F (36.9  C) Temp src: Oral BP: 110/68 Pulse: 60   Resp: 18        Constitutional:   awake, alert, cooperative, no apparent distress, and appears stated age     Abdomen:   Soft, gravid, nontender     Skin:   normal skin color, texture, turgor      Cervix:   Membranes: SROM- clear   Dilation: 3   Effacement: 70%   Station:-2   Consistency: average   Position: Mid  Presentation:vertex with AROM, then fetal arm felt, head rotated into pelvis with fetal finger felt in front of head.  Bedside US confirms vertex.   Fetal Heart Rate Tracing: reactive and reassuring  Tocometer: frequency q 2-4 minutes                    Assessment:   39.2 week IUP  Elective IOL  Sciatica  COVID-19 negative  GBS negative  Unstable lie, now with AROM        Plan:   Vertex by bedside US - will continue in high Fowlers and recheck cervix in 30 minutes to confirm vertex presentation.  Continue pitocin - may have epidural if desires.  Overall reassuring fetal tracing.     Dalila Sage MD

## 2021-09-14 NOTE — ANESTHESIA PREPROCEDURE EVALUATION
Anesthesia Pre-Procedure Evaluation    Patient: Aleida Carrizales   MRN: [id number] : 1990        Preoperative Diagnosis: * No pre-op diagnosis entered *   Procedure : * No procedures listed *     History reviewed. No pertinent past medical history.   Past Surgical History:   Procedure Laterality Date     CYST REMOVAL Right     axilla     WISDOM TOOTH EXTRACTION Bilateral       Allergies   Allergen Reactions     Amoxicillin Hives     Keflex [Cephalexin] Hives     Minocycline Hives     Penicillins Hives     Tetracycline Hives      Social History     Tobacco Use     Smoking status: Former Smoker     Quit date: 2010     Years since quittin.4     Smokeless tobacco: Never Used   Substance Use Topics     Alcohol use: No      Wt Readings from Last 1 Encounters:   21 65.3 kg (144 lb)        Anesthesia Evaluation   Pt has had prior anesthetic. Type: OB Labor Epidural.        ROS/MED HX  ENT/Pulmonary:  - neg pulmonary ROS     Neurologic:  - neg neurologic ROS     Cardiovascular:  - neg cardiovascular ROS     METS/Exercise Tolerance:     Hematologic:  - neg hematologic  ROS     Musculoskeletal:       GI/Hepatic:  - neg GI/hepatic ROS     Renal/Genitourinary:       Endo:  - neg endo ROS     Psychiatric/Substance Use:  - neg psychiatric ROS     Infectious Disease:       Malignancy:       Other:            Physical Exam    Airway        Mallampati: I   TM distance: > 3 FB   Neck ROM: full   Mouth opening: > 3 cm    Respiratory Devices and Support         Dental  no notable dental history         Cardiovascular   cardiovascular exam normal          Pulmonary   pulmonary exam normal                OUTSIDE LABS:  CBC:   Lab Results   Component Value Date    WBC 8.6 2021    WBC 7.8 2021    HGB 11.5 (L) 2021    HGB 11.2 (L) 2021    HCT 34.1 (L) 2021    HCT 33.2 (L) 2021     2021     2021     BMP:   Lab Results   Component Value Date    CR 0.60  03/29/2017     COAGS: No results found for: PTT, INR, FIBR  POC: No results found for: BGM, HCG, HCGS  HEPATIC:   Lab Results   Component Value Date    ALT 14 03/29/2017    AST 19 03/29/2017     OTHER: No results found for: PH, LACT, A1C, CHIKA, PHOS, MAG, LIPASE, AMYLASE, TSH, T4, T3, CRP, SED    Anesthesia Plan    ASA Status:  2      Anesthesia Type: Epidural.              Consents    Anesthesia Plan(s) and associated risks, benefits, and realistic alternatives discussed. Questions answered and patient/representative(s) expressed understanding.     - Discussed with:  Patient         Postoperative Care            Comments:           neg OB ROS.       Ty Anaya MD

## 2021-09-14 NOTE — PROGRESS NOTES
EFM category 2 with repetitive late decels, moderate variability. SVE remains 3-4cm/80%/-2. Did not improve with position changes x2, so pitocin was discontinued at 1626 and IV fluid bolus started. Dr. Sage updated at this time, stated to give fetus 10 more minutes to recover and to restart pitocin again at 4mU/hr. Patient agreeable to plan, currently comfortable with epidural.

## 2021-09-14 NOTE — PROGRESS NOTES
SPIRITUAL HEALTH SERVICES Progress Note  I met with Aleida and two family members this afternoon. She was very happy to be her and looking forward to meeting their baby. She did not have any spiritual care needs this afternoon, but is aware we are available as needed while they are here.       Sam Taylor  Associate

## 2021-09-15 PROCEDURE — 250N000013 HC RX MED GY IP 250 OP 250 PS 637: Performed by: OBSTETRICS & GYNECOLOGY

## 2021-09-15 PROCEDURE — C9803 HOPD COVID-19 SPEC COLLECT: HCPCS

## 2021-09-15 PROCEDURE — 120N000001 HC R&B MED SURG/OB

## 2021-09-15 RX ADMIN — DOCUSATE SODIUM 100 MG: 100 CAPSULE, LIQUID FILLED ORAL at 10:20

## 2021-09-15 RX ADMIN — IBUPROFEN 800 MG: 800 TABLET ORAL at 16:25

## 2021-09-15 RX ADMIN — IBUPROFEN 800 MG: 800 TABLET ORAL at 10:20

## 2021-09-15 RX ADMIN — IBUPROFEN 800 MG: 800 TABLET ORAL at 03:30

## 2021-09-15 RX ADMIN — ACETAMINOPHEN 650 MG: 325 TABLET ORAL at 21:00

## 2021-09-15 RX ADMIN — ACETAMINOPHEN 650 MG: 325 TABLET ORAL at 02:20

## 2021-09-15 RX ADMIN — Medication: at 16:25

## 2021-09-15 RX ADMIN — IBUPROFEN 800 MG: 800 TABLET ORAL at 22:11

## 2021-09-15 NOTE — PROGRESS NOTES
Vaginal Delivery Postpartum Day 1    Patient Name:  Aleida Carrizales  :  1990  MRN:  [id number]      Subjective:  The patient feels well:  Voiding without difficulty, lochia normal, tolerating normal diet, and passing flatus.  Pain is well controlled with current medications.  The patient has no emotional concerns.  The baby is well and being fed by breast.    Objective:  GEN: NAD, A&O x3   ABD: soft and NT  Uterus: firm and NT  EXT: no edema    Urinary output is adequate.     No results found for requested labs within last 2700 days.     Assessment:  Normal postpartum course. PPD 1  Plan:  Continue routine postpartum care.  Provider:  Irlanda Sauer MD

## 2021-09-15 NOTE — ANESTHESIA POSTPROCEDURE EVALUATION
Patient: Aleida Carrizales    * No procedures listed *    Diagnosis:* No pre-op diagnosis entered *  Diagnosis Additional Information: No value filed.    Anesthesia Type:  No value filed.    Note:  Disposition: Inpatient   Postop Pain Control: Uneventful            Sign Out: Well controlled pain   PONV: No   Neuro/Psych: Uneventful            Sign Out: Acceptable/Baseline neuro status   Airway/Respiratory: Uneventful            Sign Out: Acceptable/Baseline resp. status   CV/Hemodynamics: Uneventful            Sign Out: Acceptable CV status; No obvious hypovolemia; No obvious fluid overload   Other NRE: NONE   DID A NON-ROUTINE EVENT OCCUR? No    Event details/Postop Comments:  No complaints after labor epidural. Happy with analgesia.           Last vitals:  Vitals Value Taken Time   BP     Temp     Pulse     Resp     SpO2         Electronically Signed By: Ed Leyva MD  September 15, 2021  2:40 PM

## 2021-09-15 NOTE — LACTATION NOTE
This note was copied from a baby's chart.  Met with Aleida to see how breast feeding is going.  This is her third baby, she breast fed the other two without difficulties.  Robi has been sleepy and spitty, but latches well when he feeds.  He was cueing to feed during visit, in cradle hold, baby was able to latch independently.  The latch was pinchy, showed mom how to flange upper lip with increased comfort.  We reviewed feeding baby 8-10 times in 24 hours, offering both breasts every feeding and make sure baby meets his output goals.  She has a Medela breast pump for home and knows how to use it.  We also reviewed lactation resources in education folder and breast feeding essentials book.  Will follow up as needed.

## 2021-09-15 NOTE — PLAN OF CARE
Problem: Adjustment to Role Transition (Postpartum Vaginal Delivery)  Goal: Successful Maternal Role Transition  Outcome: Improving     Problem: Pain (Postpartum Vaginal Delivery)  Goal: Acceptable Pain Control  Outcome: Declining   Pt will verbilize when her pain is starting to become a 3 out of 10. Pt was educated on the importance of keeping up with pain management rather than trying to play catch up with pain. Pt verbilized an understanding.    Problem: Bleeding (Postpartum Vaginal Delivery)  Goal: Hemostasis  Outcome: Improving   Vaginal bleeding is minimal with firm fundas. Will continue to monitor

## 2021-09-15 NOTE — CONSULTS
Integrative Therapy Consult    Healing PresenceYes  Essential Oils: Topical (EO/Topical Oil)     Mandarin - HC, Lavender Massage Oil - HC       Healing Music:       Breathwork:       Guided Imagery:       Acupressure:       Oshibori:       Energy Therapy:       Healing Touch:       Reiki:       Qi Gong:     Massage: Foot      Targeted Massage:    Sleep Promotion:       Other Therapy:       Intervention Reason: Well Being     Pre and Post Session Scores: Patient Desires Treatment: yes                           Delivery:         Referrals:      Leonora BUCKLEY Born

## 2021-09-15 NOTE — PLAN OF CARE
Problem: Adult Inpatient Plan of Care  Goal: Patient-Specific Goal (Individualized)  Outcome: Improving   Patient reports she is feeling well. Patient is up independently in room. Patient states baby is breastfeeding well. Patient reports she would like to stay overnight to facilitate infant circumcision in AM.

## 2021-09-15 NOTE — CONSULTS
"ACUPUNCTURIST TREATMENT NOTE    Name: Aleida Carrizales  :  1990  MRN:  [id number]    Acupuncture Treatment  Patient Type: Maternity  Intervention Reason: Pain  Pain Location: low back  Pre-session Pain Ratin  Post-session Pain Ratin  Patient complaint:: low back pain at epidural site and abdominal pain  Initial insertions: BL 20, BL 24, LI 4, (R) St 36, Anna 3, SI 3, (R) Anna 5       Risks and benefits of acupuncture were discussed with patient. Consent for treatment was given. We thank you for the referral.\"     Henna Melendez L.Ac.     Date:  9/15/2021  Time:  1:33 PM    "

## 2021-09-15 NOTE — L&D DELIVERY NOTE
OB Vaginal Delivery Note    Aleida Carrizales MRN# [id number]   Age: 31 year old YOB: 1990     Predelivery Diagnosis:  1) 31 year old  at 39w2d      2) GBS negative   3) COVID-19 negative  4) Sciatica  5) Fatigue in pregnancy  6) Elective induction of labor     Postdelivery Diagnosis:  1) 31 year old  @ 39w2d   2) Delivery of a viable male     Delivery Type: Vaginal, Spontaneous   [place] Weight:  pending   1 Minute 5 Minute 10 Minute   Apgar Totals:            Delivery Personnel: NICHOLAS BALDERRAMA EMILY A   Delivery Physician: Dalila Sage MD    Augmentation: none  Induction: Pitocin, AROM - elective    Internal Monitors: none   ROM:  AROM  Fluid type: Clear  Labor analgesia/anesthesia: epidural     Labor Complications: None   Delivery QBL: 383 mL    Delivery Details:  Aleida Carrizales, a 31 year old  female with prenatal care with ATWS, and pregnancy complicated by sciatica and fatigue, presented to L&D with plan for induction.    Her labor course was complicated by fetal decelerations in active labor - resolved with position changes and pitocin discontinuation.  Pitocin was restarted after fetal tracing was reactive, and she progressed in labor.  Patient became fully dilated and pushed in active labor. Delivery was via vaginal, spontaneous  under epidural  anesthesia. Infant delivered in vertex  right  occiput  anterior  position. The shoulders were delivered in usual fashion.  The cord was clamped x 2, cut, and   were noted.     Live, vigorous infant was handed to mom.      Shoulder Dystocia: No     Operative Vaginal Delivery: No    Cord complications: none   Placenta delivered, was inspected and found to be intact.  Placental disposition was Hospital disposal .     Episiotomy: none    Perineum, vagina, cervix were inspected, and the following lacerations were noted:   Perineal lacerations: 2nd , periurethral laceration: bilateral           Any lacerations  were repaired in the standard fashion using epidural anesthesia and 3-0 Rapide suture.  Excellent hemostasis was noted. Needle/sharps count, laparotomy sponge count was correct. The periurethral lacerations were superficial and hemostatic.     Infant and patient in delivery room in good and stable condition.     Delivery was complicated by nothing.  Interventions included pitocin and massage.     Dalila Sage MD       Renetta Carrizales [[id number]]    Labor Event Times    Labor onset date: 21 Onset time:  5:00 PM   Dilation complete date: 21 Complete time:  6:43 PM   Start pushing date/time: 2021 1846      Labor Events     labor?: No   steroids: None     Antibiotics received during labor?: No     Induction: Oxytocin, AROM  Induction date/time:     Cervical ripening date/time:     Indications for induction: Elective     Augmentation: None     Delivery/Placenta Date and Time    Delivery Date: 21 Delivery Time:  6:48 PM   Placenta Date/Time: 2021  6:52 PM  Oxytocin given at the time of delivery: after delivery of baby  Delivering clinician: Dalila Sage MD   Other personnel present at delivery:  Provider Role   Shea Mehta, RN    Moni Sargent RN          Cord    Cord Complications: None               Stem cell collection?: No       Labor Events and Shoulder Dystocia    Fetal Tracing Prior to Delivery: Category 2  Fetal Tracing Comments: variable decelerations with pushing  Shoulder dystocia present?: Neg     Delivery (Maternal) (Provider to Complete) (502151)    Episiotomy: None  Perineal lacerations: 2nd Repaired?: Yes   Periurethral laceration: bilateral Repaired?: No   Repair suture: 3-0 Rapide  Number of repair packets: 1  Genital tract inspection done: Pos     Blood Loss  Mother: Sofie Aleida CAESAR #[id number]   Start of Mother's Information    Delivery Blood Loss  21 1700 - 21 1912    Delivery QBL (mL) Hospital Encounter 383  mL    Total  383 mL         End of Mother's Information  Mother: Aleida Carrizales #[id number]          Delivery - Provider to Complete ([id number])    Delivering clinician: Dalila Sage MD  Attempted Delivery Types (Choose all that apply): Spontaneous Vaginal Delivery  Delivery Type (Choose the 1 that will go to the Birth History): Vaginal, Spontaneous                   Other personnel:  Provider Role   Shea Mehta RN Ziebell, Emily A, RN                 Placenta    Date/Time: 9/14/2021  6:52 PM  Removal: Spontaneous  Disposition: Hospital disposal           Anesthesia    Method: Epidural  Cervical dilation at placement: 0-3                Presentation and Position    Presentation: Vertex    Position: Right Occiput Anterior                 Dalila Sage MD    9/14/2021 7:06 PM

## 2021-09-15 NOTE — PLAN OF CARE
Problem: Adult Inpatient Plan of Care  Goal: Plan of Care Review  Outcome: Improving   Aleida is independent of cares, she is voiding without difficulty, ambulating in room, and confidant with infant cares.

## 2021-09-16 VITALS
BODY MASS INDEX: 25.52 KG/M2 | SYSTOLIC BLOOD PRESSURE: 126 MMHG | DIASTOLIC BLOOD PRESSURE: 57 MMHG | HEART RATE: 70 BPM | HEIGHT: 63 IN | RESPIRATION RATE: 16 BRPM | OXYGEN SATURATION: 98 % | WEIGHT: 144 LBS | TEMPERATURE: 98.6 F

## 2021-09-16 PROCEDURE — 250N000013 HC RX MED GY IP 250 OP 250 PS 637: Performed by: OBSTETRICS & GYNECOLOGY

## 2021-09-16 RX ORDER — METOCLOPRAMIDE HYDROCHLORIDE 5 MG/ML
10 INJECTION INTRAMUSCULAR; INTRAVENOUS EVERY 6 HOURS PRN
Status: DISCONTINUED | OUTPATIENT
Start: 2021-09-16 | End: 2021-09-16 | Stop reason: CLARIF

## 2021-09-16 RX ORDER — IBUPROFEN 800 MG/1
800 TABLET, FILM COATED ORAL EVERY 6 HOURS PRN
Qty: 30 TABLET | Refills: 0 | Status: SHIPPED | OUTPATIENT
Start: 2021-09-16

## 2021-09-16 RX ORDER — DOCUSATE SODIUM 100 MG/1
100 CAPSULE, LIQUID FILLED ORAL DAILY
Qty: 30 CAPSULE | Refills: 0 | Status: SHIPPED | OUTPATIENT
Start: 2021-09-17

## 2021-09-16 RX ORDER — PROCHLORPERAZINE 25 MG
25 SUPPOSITORY, RECTAL RECTAL EVERY 12 HOURS PRN
Status: DISCONTINUED | OUTPATIENT
Start: 2021-09-16 | End: 2021-09-16 | Stop reason: CLARIF

## 2021-09-16 RX ORDER — ONDANSETRON 2 MG/ML
4 INJECTION INTRAMUSCULAR; INTRAVENOUS EVERY 6 HOURS PRN
Status: DISCONTINUED | OUTPATIENT
Start: 2021-09-16 | End: 2021-09-16 | Stop reason: CLARIF

## 2021-09-16 RX ORDER — ONDANSETRON 4 MG/1
4 TABLET, ORALLY DISINTEGRATING ORAL EVERY 6 HOURS PRN
Status: DISCONTINUED | OUTPATIENT
Start: 2021-09-16 | End: 2021-09-16 | Stop reason: CLARIF

## 2021-09-16 RX ORDER — METOCLOPRAMIDE 10 MG/1
10 TABLET ORAL EVERY 6 HOURS PRN
Status: DISCONTINUED | OUTPATIENT
Start: 2021-09-16 | End: 2021-09-16 | Stop reason: CLARIF

## 2021-09-16 RX ORDER — PROCHLORPERAZINE MALEATE 10 MG
10 TABLET ORAL EVERY 6 HOURS PRN
Status: DISCONTINUED | OUTPATIENT
Start: 2021-09-16 | End: 2021-09-16 | Stop reason: CLARIF

## 2021-09-16 RX ADMIN — ACETAMINOPHEN 650 MG: 325 TABLET ORAL at 04:37

## 2021-09-16 RX ADMIN — IBUPROFEN 800 MG: 800 TABLET ORAL at 06:42

## 2021-09-16 RX ADMIN — DOCUSATE SODIUM 100 MG: 100 CAPSULE, LIQUID FILLED ORAL at 08:58

## 2021-09-16 NOTE — DISCHARGE SUMMARY
"HOSPITAL DISCHARGE SUMMARY - Vaginal Birth    Patient Name: Aleida Carrizales   YOB: 1990  Age: 31 year old  Medical Record Number: [id number]  Admission Date: 9/14/2021  Discharge Date: 9/16/2021    Aleida Carrizales will be discharged from Hospital to home. She feels well and has no emotional concerns. Pain is well controlled with current medications. The baby is doing well.     /57 (BP Location: Left arm)   Pulse 70   Temp 98.6  F (37  C) (Oral)   Resp 16   Ht 1.6 m (5' 3\")   Wt 65.3 kg (144 lb)   LMP 12/13/2020   SpO2 97%   Breastfeeding Unknown   BMI 25.51 kg/m      REASON FOR ADMISSION: Labor and Delivery    MEDS: see  Med rec    PROCEDURES: Vaginal delivery    HISTORY OF PRESENT ILLNESS AND HOSPITAL COURSE: This is a 31 year old female who underwent vaginal delivery without complication. The postpartum course was unremarkable. She was instructed to avoid any heavy lifting and to call if she had any difficulties.     DISCHARGE INSTRUCTION: Discharge to home with instructions to follow up in the office in 6 weeks. Pelvic rest x 6 weeks.    Sultana Paredes MD        "

## 2021-09-16 NOTE — LACTATION NOTE
Follow up with Aleida to see how breast feeding went through the night.  Baby did some cluster feeding off and on, but feels that baby is latching well and denies nipple tenderness.  She is aware of lactation resources after discharge and has no questions at this time.

## 2021-09-16 NOTE — PLAN OF CARE
Problem: Adult Inpatient Plan of Care  Goal: Plan of Care Review  Outcome: Improving     Reviewed plan of care with Aleida.  The plan is to discharge her at noon.  Answered all questions and concerns.  Aleida verbalized understanding.  Nothing further at this time.

## 2021-09-23 ENCOUNTER — TELEPHONE (OUTPATIENT)
Dept: OBGYN | Facility: CLINIC | Age: 31
End: 2021-09-23

## 2021-09-23 NOTE — TELEPHONE ENCOUNTER
Triage call:   Returned call to patient-     Patient states that she has contacted her own OB already about her symptoms- outside of [facility]. She states that the number she had for her OB connected her to [facility] but she had not been seen with [facility] for her OB care- patient is uncertain about how that happened. Patient states she is just waiting to hear back from her OB provider about her mastitis symptoms. No additional action needed.     Maria Fernanda James RN BSN 9/23/2021 12:13 PM

## 2021-09-23 NOTE — TELEPHONE ENCOUNTER
Clogged ducts or mastitis, major body aches, chills. She feels like she is doing every thing she is supposed to do. She is wondering if she needs to get on an antibiotic?

## 2021-10-16 ENCOUNTER — HEALTH MAINTENANCE LETTER (OUTPATIENT)
Age: 31
End: 2021-10-16

## 2022-07-17 ENCOUNTER — HEALTH MAINTENANCE LETTER (OUTPATIENT)
Age: 32
End: 2022-07-17

## 2022-08-26 NOTE — TELEPHONE ENCOUNTER
I haven't seen pt before. I would advise that she notify her ob/gyn and review/discuss options for management     26-Aug-2022 15:49

## 2022-09-25 ENCOUNTER — HEALTH MAINTENANCE LETTER (OUTPATIENT)
Age: 32
End: 2022-09-25

## 2023-08-05 ENCOUNTER — HEALTH MAINTENANCE LETTER (OUTPATIENT)
Age: 33
End: 2023-08-05

## 2024-01-09 NOTE — PROGRESS NOTES
Contact  [place]/Voicemail    Referral Source: Care Team  Clinical Data:  Outreach  Outreach attempted x 2.  Left message on voicemail with call back information and requested return call.  Plan:  will send unable to contact letter with care coordinator contact information if no call back in 5 days.  will do no further outreaches at this time.  Lilian Rose,   [facility]  [phone]      Clinic Care Coordination Contact  [place]/Voicemail     Referral received from [facility] to assist with locating [facility] at no cost or low cost.Pt lives in [place], [place]   Clinical Data: Care Coordinator Outreach  Outreach attempted x 1.  Left message on patient's voicemail with call back information and requested return call.  Left information on vm about [facility] in [facility] services with contact information  [email or web address]  Plan:   Care Coordinator will try to reach patient again in 1-2 weeks to confirm information received and if any additional support needed.       
spontaneous/unlabored